# Patient Record
Sex: FEMALE | Race: WHITE | NOT HISPANIC OR LATINO | URBAN - METROPOLITAN AREA
[De-identification: names, ages, dates, MRNs, and addresses within clinical notes are randomized per-mention and may not be internally consistent; named-entity substitution may affect disease eponyms.]

---

## 2021-01-26 ENCOUNTER — OFFICE VISIT (OUTPATIENT)
Dept: URBAN - METROPOLITAN AREA CLINIC 68 | Facility: CLINIC | Age: 71
End: 2021-01-26

## 2021-02-23 ENCOUNTER — OFFICE VISIT (OUTPATIENT)
Dept: URBAN - METROPOLITAN AREA CLINIC 68 | Facility: CLINIC | Age: 71
End: 2021-02-23

## 2021-08-10 ENCOUNTER — OFFICE VISIT (OUTPATIENT)
Dept: URBAN - METROPOLITAN AREA CLINIC 68 | Facility: CLINIC | Age: 71
End: 2021-08-10

## 2021-08-12 ENCOUNTER — TELEPHONE ENCOUNTER (OUTPATIENT)
Dept: URBAN - METROPOLITAN AREA CLINIC 68 | Facility: CLINIC | Age: 71
End: 2021-08-12

## 2021-08-17 ENCOUNTER — TELEPHONE ENCOUNTER (OUTPATIENT)
Dept: URBAN - METROPOLITAN AREA CLINIC 68 | Facility: CLINIC | Age: 71
End: 2021-08-17

## 2021-09-01 ENCOUNTER — OFFICE VISIT (OUTPATIENT)
Dept: URBAN - METROPOLITAN AREA CLINIC 68 | Facility: CLINIC | Age: 71
End: 2021-09-01

## 2021-11-04 ENCOUNTER — OFFICE VISIT (OUTPATIENT)
Dept: URBAN - METROPOLITAN AREA CLINIC 68 | Facility: CLINIC | Age: 71
End: 2021-11-04

## 2022-02-16 ENCOUNTER — TELEPHONE ENCOUNTER (OUTPATIENT)
Dept: URBAN - METROPOLITAN AREA CLINIC 68 | Facility: CLINIC | Age: 72
End: 2022-02-16

## 2022-02-17 ENCOUNTER — TELEPHONE ENCOUNTER (OUTPATIENT)
Dept: URBAN - METROPOLITAN AREA CLINIC 68 | Facility: CLINIC | Age: 72
End: 2022-02-17

## 2022-02-22 ENCOUNTER — OFFICE VISIT (OUTPATIENT)
Dept: URBAN - METROPOLITAN AREA CLINIC 68 | Facility: CLINIC | Age: 72
End: 2022-02-22

## 2022-02-22 ENCOUNTER — TELEPHONE ENCOUNTER (OUTPATIENT)
Dept: URBAN - METROPOLITAN AREA CLINIC 68 | Facility: CLINIC | Age: 72
End: 2022-02-22

## 2022-03-10 ENCOUNTER — TELEPHONE ENCOUNTER (OUTPATIENT)
Dept: URBAN - METROPOLITAN AREA CLINIC 68 | Facility: CLINIC | Age: 72
End: 2022-03-10

## 2022-03-11 ENCOUNTER — TELEPHONE ENCOUNTER (OUTPATIENT)
Dept: URBAN - METROPOLITAN AREA CLINIC 68 | Facility: CLINIC | Age: 72
End: 2022-03-11

## 2022-03-11 ENCOUNTER — OFFICE VISIT (OUTPATIENT)
Dept: URBAN - METROPOLITAN AREA CLINIC 68 | Facility: CLINIC | Age: 72
End: 2022-03-11

## 2022-03-12 ENCOUNTER — TELEPHONE ENCOUNTER (OUTPATIENT)
Dept: URBAN - METROPOLITAN AREA CLINIC 68 | Facility: CLINIC | Age: 72
End: 2022-03-12

## 2022-03-22 ENCOUNTER — TELEPHONE ENCOUNTER (OUTPATIENT)
Dept: URBAN - METROPOLITAN AREA CLINIC 68 | Facility: CLINIC | Age: 72
End: 2022-03-22

## 2022-04-04 ENCOUNTER — OFFICE VISIT (OUTPATIENT)
Dept: URBAN - METROPOLITAN AREA CLINIC 68 | Facility: CLINIC | Age: 72
End: 2022-04-04

## 2022-06-04 ENCOUNTER — TELEPHONE ENCOUNTER (OUTPATIENT)
Dept: URBAN - METROPOLITAN AREA CLINIC 68 | Facility: CLINIC | Age: 72
End: 2022-06-04

## 2022-06-04 RX ORDER — SODIUM SULFATE, POTASSIUM SULFATE, MAGNESIUM SULFATE 17.5; 3.13; 1.6 G/ML; G/ML; G/ML
SOLUTION, CONCENTRATE ORAL AS DIRECTED
Qty: 1 | Refills: 0 | OUTPATIENT
Start: 2018-10-17 | End: 2018-10-18

## 2022-06-04 RX ORDER — METRONIDAZOLE 500 MG/1
TABLET ORAL
Qty: 42 | Refills: 0 | OUTPATIENT
Start: 2021-08-12 | End: 2021-08-26

## 2022-06-04 RX ORDER — CIPROFLOXACIN HYDROCHLORIDE 500 MG/1
TABLET, FILM COATED ORAL
Qty: 28 | Refills: 0 | OUTPATIENT
Start: 2021-08-12 | End: 2021-08-26

## 2022-06-05 ENCOUNTER — TELEPHONE ENCOUNTER (OUTPATIENT)
Dept: URBAN - METROPOLITAN AREA CLINIC 68 | Facility: CLINIC | Age: 72
End: 2022-06-05

## 2022-06-05 RX ORDER — ATORVASTATIN CALCIUM 40 MG/1
ATORVASTATIN CALCIUM( 40MG ORAL  DAILY ) ACTIVE -HX ENTRY TABLET, FILM COATED ORAL DAILY
Status: ACTIVE | COMMUNITY
Start: 2022-04-04

## 2022-06-05 RX ORDER — ALENDRONATE SODIUM 70 MG/1
ALENDRONATE SODIUM( 70MG ORAL  DAILY ) ACTIVE -HX ENTRY TABLET ORAL DAILY
Status: ACTIVE | COMMUNITY
Start: 2022-04-04

## 2022-06-25 ENCOUNTER — TELEPHONE ENCOUNTER (OUTPATIENT)
Age: 72
End: 2022-06-25

## 2022-06-25 RX ORDER — SODIUM SULFATE, POTASSIUM SULFATE, MAGNESIUM SULFATE 17.5; 3.13; 1.6 G/ML; G/ML; G/ML
SOLUTION, CONCENTRATE ORAL AS DIRECTED
Qty: 1 | Refills: 0 | OUTPATIENT
Start: 2018-10-17 | End: 2018-10-18

## 2022-06-25 RX ORDER — HYDROCORTISONE ACETATE AND PRAMOXINE HYDROCHLORIDE 25; 10 MG/G; MG/G
CREAM TOPICAL
Qty: 28 | Refills: 28 | OUTPATIENT
Start: 2018-10-30 | End: 2018-11-16

## 2022-06-25 RX ORDER — METRONIDAZOLE 500 MG/1
TABLET ORAL
Qty: 42 | Refills: 0 | OUTPATIENT
Start: 2021-08-12 | End: 2021-08-26

## 2022-06-25 RX ORDER — CIPROFLOXACIN HYDROCHLORIDE 500 MG/1
TABLET, FILM COATED ORAL
Qty: 28 | Refills: 0 | OUTPATIENT
Start: 2021-08-12 | End: 2021-08-26

## 2022-06-26 ENCOUNTER — TELEPHONE ENCOUNTER (OUTPATIENT)
Age: 72
End: 2022-06-26

## 2022-06-26 RX ORDER — VITAMIN E (DL,TOCOPHERYL ACET) 180 MG
COQ10(    DAILY ) ACTIVE -HX ENTRY CAPSULE ORAL DAILY
Status: ACTIVE | COMMUNITY
Start: 2022-04-04

## 2022-06-26 RX ORDER — BIFIDOBACTERIUM LONGUM SUBSP. INFANTIS, AVOBENZONE, HOMOSALATE, OCTISALATE, OCTOCRYLENE, AND OXYBENZONE
ALIGN(    DAILY ) ACTIVE -HX ENTRY KIT DAILY
Status: ACTIVE | COMMUNITY
Start: 2022-04-04

## 2022-06-26 RX ORDER — CALCIUM CARBONATE/VITAMIN D3 600 MG-10
CALCIUM(    DAILY ) ACTIVE -HX ENTRY TABLET ORAL DAILY
Status: ACTIVE | COMMUNITY
Start: 2022-04-04

## 2022-06-26 RX ORDER — ALENDRONATE SODIUM 70 MG/1
ALENDRONATE SODIUM( 70MG ORAL  DAILY ) ACTIVE -HX ENTRY TABLET ORAL DAILY
Status: ACTIVE | COMMUNITY
Start: 2022-04-04

## 2022-06-26 RX ORDER — ASPIRIN 81 MG/1
BAYER ASPIRIN(   81MG DAILY ) ACTIVE -HX ENTRY TABLET ORAL DAILY
Status: ACTIVE | COMMUNITY
Start: 2022-04-04

## 2022-06-26 RX ORDER — ATORVASTATIN CALCIUM 40 MG/1
ATORVASTATIN CALCIUM( 40MG ORAL  DAILY ) ACTIVE -HX ENTRY TABLET, FILM COATED ORAL DAILY
Status: ACTIVE | COMMUNITY
Start: 2022-04-04

## 2023-09-11 ENCOUNTER — LAB OUTSIDE AN ENCOUNTER (OUTPATIENT)
Dept: URBAN - METROPOLITAN AREA CLINIC 68 | Facility: CLINIC | Age: 73
End: 2023-09-11

## 2023-09-11 ENCOUNTER — WEB ENCOUNTER (OUTPATIENT)
Dept: URBAN - METROPOLITAN AREA CLINIC 68 | Facility: CLINIC | Age: 73
End: 2023-09-11

## 2023-09-11 ENCOUNTER — OFFICE VISIT (OUTPATIENT)
Dept: URBAN - METROPOLITAN AREA CLINIC 68 | Facility: CLINIC | Age: 73
End: 2023-09-11
Payer: MEDICARE

## 2023-09-11 VITALS
HEIGHT: 64 IN | WEIGHT: 172 LBS | DIASTOLIC BLOOD PRESSURE: 72 MMHG | BODY MASS INDEX: 29.37 KG/M2 | OXYGEN SATURATION: 92 % | HEART RATE: 76 BPM | SYSTOLIC BLOOD PRESSURE: 122 MMHG

## 2023-09-11 DIAGNOSIS — K57.90 DIVERTICULOSIS: ICD-10-CM

## 2023-09-11 DIAGNOSIS — K59.04 CHRONIC IDIOPATHIC CONSTIPATION: ICD-10-CM

## 2023-09-11 DIAGNOSIS — Z86.010 HISTORY OF COLON POLYPS: ICD-10-CM

## 2023-09-11 PROBLEM — 397881000: Status: ACTIVE | Noted: 2023-09-11

## 2023-09-11 PROBLEM — 82934008: Status: ACTIVE | Noted: 2023-09-11

## 2023-09-11 PROCEDURE — 99214 OFFICE O/P EST MOD 30 MIN: CPT | Performed by: INTERNAL MEDICINE

## 2023-09-11 RX ORDER — BIFIDOBACTERIUM LONGUM SUBSP. INFANTIS, AVOBENZONE, HOMOSALATE, OCTISALATE, OCTOCRYLENE, AND OXYBENZONE
ALIGN(    DAILY ) ACTIVE -HX ENTRY KIT DAILY
Status: DISCONTINUED | COMMUNITY
Start: 2022-04-04

## 2023-09-11 RX ORDER — CALCIUM CARBONATE/VITAMIN D3 600 MG-10
CALCIUM(    DAILY ) ACTIVE -HX ENTRY TABLET ORAL DAILY
Status: ACTIVE | COMMUNITY
Start: 2022-04-04

## 2023-09-11 RX ORDER — LORATADINE 10 MG
1 PACKET MIXED WITH 8 OUNCES OF FLUID TABLET,DISINTEGRATING ORAL ONCE A DAY
Status: ACTIVE | COMMUNITY

## 2023-09-11 RX ORDER — ASPIRIN 81 MG/1
BAYER ASPIRIN(   81MG DAILY ) ACTIVE -HX ENTRY TABLET ORAL DAILY
Status: ACTIVE | COMMUNITY
Start: 2022-04-04

## 2023-09-11 RX ORDER — PSYLLIUM SEED
AS DIRECTED PACKET (EA) ORAL
Status: ACTIVE | COMMUNITY

## 2023-09-11 RX ORDER — ASPIRIN 81 MG/1
BAYER ASPIRIN(   81MG DAILY ) ACTIVE -HX ENTRY TABLET ORAL DAILY
Status: DISCONTINUED | COMMUNITY
Start: 2022-04-04

## 2023-09-11 RX ORDER — VITAMIN E (DL,TOCOPHERYL ACET) 180 MG
COQ10(    DAILY ) ACTIVE -HX ENTRY CAPSULE ORAL DAILY
Status: ACTIVE | COMMUNITY
Start: 2022-04-04

## 2023-09-11 RX ORDER — ALENDRONATE SODIUM 70 MG/1
ALENDRONATE SODIUM( 70MG ORAL  DAILY ) ACTIVE -HX ENTRY TABLET ORAL DAILY
Status: ACTIVE | COMMUNITY
Start: 2022-04-04

## 2023-09-11 RX ORDER — SOD SULF/POT CHLORIDE/MAG SULF 1.479 G
12 TABLETS THE FIRST DOSE THE EVENING BEFORE AND SECOND DOSE THE MORNING OF COLONOSCOPY TABLET ORAL TWICE A DAY
Qty: 24 | OUTPATIENT
Start: 2023-09-11 | End: 2023-09-12

## 2023-09-11 RX ORDER — ATORVASTATIN CALCIUM 40 MG/1
ATORVASTATIN CALCIUM( 40MG ORAL  DAILY ) ACTIVE -HX ENTRY TABLET, FILM COATED ORAL DAILY
Status: ACTIVE | COMMUNITY
Start: 2022-04-04

## 2023-09-11 RX ORDER — CALCIUM CARBONATE/VITAMIN D3 600 MG-10
CALCIUM(    DAILY ) ACTIVE -HX ENTRY TABLET ORAL DAILY
Status: DISCONTINUED | COMMUNITY
Start: 2022-04-04

## 2023-09-11 RX ORDER — VITAMIN E (DL,TOCOPHERYL ACET) 180 MG
COQ10(    DAILY ) ACTIVE -HX ENTRY CAPSULE ORAL DAILY
Status: DISCONTINUED | COMMUNITY
Start: 2022-04-04

## 2023-09-11 RX ORDER — SODIUM PICOSULFATE, MAGNESIUM OXIDE, AND ANHYDROUS CITRIC ACID 12; 3.5; 1 G/175ML; G/175ML; MG/175ML
175 ML THE FIRST DOSE THE EVENING BEFORE AND SECOND DOSE THE MORNING OF COLONOSCOPY LIQUID ORAL ONCE A DAY
Qty: 350 | OUTPATIENT
Start: 2023-09-11 | End: 2023-09-13

## 2023-09-11 NOTE — HPI-TODAY'S VISIT:
73 y.o. WF with a history of colon polyps and chronic constipation who is here for follow up. She did have an incomplete colonoscopy in 2018 and had a CT virtual colonoscopy which showed diverticulosis and IH. She had 2 gastrograffin enema which showed diverticulosis and IH. She has tried Metamucil, Miralax, Calcium with Magnesium, and Flax seed oil. She is agreeable to prep for planned colonoscopy and if unsuccessful then will refer for virtual colonoscopy.

## 2023-10-31 ENCOUNTER — OFFICE VISIT (OUTPATIENT)
Dept: URBAN - METROPOLITAN AREA CLINIC 68 | Facility: CLINIC | Age: 73
End: 2023-10-31

## 2023-11-27 ENCOUNTER — CLAIMS CREATED FROM THE CLAIM WINDOW (OUTPATIENT)
Dept: URBAN - METROPOLITAN AREA SURGERY CENTER 12 | Facility: SURGERY CENTER | Age: 73
End: 2023-11-27
Payer: MEDICARE

## 2023-11-27 ENCOUNTER — CLAIMS CREATED FROM THE CLAIM WINDOW (OUTPATIENT)
Dept: URBAN - METROPOLITAN AREA CLINIC 4 | Facility: CLINIC | Age: 73
End: 2023-11-27
Payer: MEDICARE

## 2023-11-27 DIAGNOSIS — K63.5 POLYP OF SIGMOID COLON, UNSPECIFIED TYPE: ICD-10-CM

## 2023-11-27 DIAGNOSIS — K57.30 DIVERTICULOSIS OF LARGE INTESTINE WITHOUT PERFORATION OR ABSCESS WITHOUT BLEEDING: ICD-10-CM

## 2023-11-27 DIAGNOSIS — K63.89 OTHER SPECIFIED DISEASES OF INTESTINE: ICD-10-CM

## 2023-11-27 DIAGNOSIS — Z87.19 PERSONAL HISTORY OF OTHER DISEASES OF THE DIGESTIVE SYSTEM: ICD-10-CM

## 2023-11-27 DIAGNOSIS — K64.1 SECOND DEGREE HEMORRHOIDS: ICD-10-CM

## 2023-11-27 DIAGNOSIS — D12.5 ADENOMATOUS POLYP OF SIGMOID COLON: ICD-10-CM

## 2023-11-27 DIAGNOSIS — K64.8 OTHER HEMORRHOIDS: ICD-10-CM

## 2023-11-27 DIAGNOSIS — Z12.11 ENCOUNTER FOR SCREENING FOR MALIGNANT NEOPLASM OF COLON: ICD-10-CM

## 2023-11-27 DIAGNOSIS — Q43.8 OTHER SPECIFIED CONGENITAL MALFORMATIONS OF INTESTINE: ICD-10-CM

## 2023-11-27 DIAGNOSIS — K57.30 COLON, DIVERTICULOSIS: ICD-10-CM

## 2023-11-27 DIAGNOSIS — Z86.010 ADENOMAS PERSONAL HISTORY OF COLONIC POLYPS: ICD-10-CM

## 2023-11-27 PROCEDURE — 45385 COLONOSCOPY W/LESION REMOVAL: CPT | Performed by: CLINIC/CENTER

## 2023-11-27 PROCEDURE — 88305 TISSUE EXAM BY PATHOLOGIST: CPT | Performed by: PATHOLOGY

## 2023-11-27 PROCEDURE — 45385 COLONOSCOPY W/LESION REMOVAL: CPT | Performed by: INTERNAL MEDICINE

## 2023-11-27 PROCEDURE — 00811 ANES LWR INTST NDSC NOS: CPT | Performed by: NURSE ANESTHETIST, CERTIFIED REGISTERED

## 2023-11-27 RX ORDER — PSYLLIUM SEED
AS DIRECTED PACKET (EA) ORAL
Status: ACTIVE | COMMUNITY

## 2023-11-27 RX ORDER — LORATADINE 10 MG
1 PACKET MIXED WITH 8 OUNCES OF FLUID TABLET,DISINTEGRATING ORAL ONCE A DAY
Status: ACTIVE | COMMUNITY

## 2023-11-27 RX ORDER — ALENDRONATE SODIUM 70 MG/1
ALENDRONATE SODIUM( 70MG ORAL  DAILY ) ACTIVE -HX ENTRY TABLET ORAL DAILY
Status: ACTIVE | COMMUNITY
Start: 2022-04-04

## 2023-11-27 RX ORDER — ATORVASTATIN CALCIUM 40 MG/1
ATORVASTATIN CALCIUM( 40MG ORAL  DAILY ) ACTIVE -HX ENTRY TABLET, FILM COATED ORAL DAILY
Status: ACTIVE | COMMUNITY
Start: 2022-04-04

## 2023-11-27 RX ORDER — ASPIRIN 81 MG/1
BAYER ASPIRIN(   81MG DAILY ) ACTIVE -HX ENTRY TABLET ORAL DAILY
Status: ACTIVE | COMMUNITY
Start: 2022-04-04

## 2023-11-27 RX ORDER — CALCIUM CARBONATE/VITAMIN D3 600 MG-10
CALCIUM(    DAILY ) ACTIVE -HX ENTRY TABLET ORAL DAILY
Status: ACTIVE | COMMUNITY
Start: 2022-04-04

## 2023-11-27 RX ORDER — VITAMIN E (DL,TOCOPHERYL ACET) 180 MG
COQ10(    DAILY ) ACTIVE -HX ENTRY CAPSULE ORAL DAILY
Status: ACTIVE | COMMUNITY
Start: 2022-04-04

## 2024-05-07 ENCOUNTER — DASHBOARD ENCOUNTERS (OUTPATIENT)
Age: 74
End: 2024-05-07

## 2024-05-07 ENCOUNTER — OFFICE VISIT (OUTPATIENT)
Dept: URBAN - METROPOLITAN AREA CLINIC 68 | Facility: CLINIC | Age: 74
End: 2024-05-07
Payer: MEDICARE

## 2024-05-07 VITALS
TEMPERATURE: 97.8 F | HEIGHT: 64 IN | DIASTOLIC BLOOD PRESSURE: 80 MMHG | BODY MASS INDEX: 28.85 KG/M2 | HEART RATE: 76 BPM | WEIGHT: 169 LBS | OXYGEN SATURATION: 98 % | SYSTOLIC BLOOD PRESSURE: 124 MMHG

## 2024-05-07 DIAGNOSIS — R15.0 INCOMPLETE DEFECATION: ICD-10-CM

## 2024-05-07 DIAGNOSIS — K64.9 HEMORRHOIDS, UNSPECIFIED HEMORRHOID TYPE: ICD-10-CM

## 2024-05-07 DIAGNOSIS — K59.04 CHRONIC IDIOPATHIC CONSTIPATION: ICD-10-CM

## 2024-05-07 DIAGNOSIS — L29.0 RECTAL ITCHING: ICD-10-CM

## 2024-05-07 PROBLEM — 90446007: Status: ACTIVE | Noted: 2024-05-07

## 2024-05-07 PROCEDURE — 99214 OFFICE O/P EST MOD 30 MIN: CPT

## 2024-05-07 RX ORDER — HYDROCORTISONE ACETATE AND PRAMOXINE HYDROCHLORIDE 25; 10 MG/G; MG/G
1 APPLICATION CREAM TOPICAL
Qty: 1 | Refills: 0 | OUTPATIENT
Start: 2024-05-07 | End: 2024-05-17

## 2024-05-07 RX ORDER — LORATADINE 10 MG
1 PACKET MIXED WITH 8 OUNCES OF FLUID TABLET,DISINTEGRATING ORAL ONCE A DAY
Status: ACTIVE | COMMUNITY

## 2024-05-07 RX ORDER — ALENDRONATE SODIUM 70 MG/1
ALENDRONATE SODIUM( 70MG ORAL  DAILY ) ACTIVE -HX ENTRY TABLET ORAL DAILY
Status: ACTIVE | COMMUNITY
Start: 2022-04-04

## 2024-05-07 RX ORDER — VITAMIN E (DL,TOCOPHERYL ACET) 180 MG
COQ10(    DAILY ) ACTIVE -HX ENTRY CAPSULE ORAL DAILY
Status: ACTIVE | COMMUNITY
Start: 2022-04-04

## 2024-05-07 RX ORDER — PSYLLIUM SEED
AS DIRECTED PACKET (EA) ORAL
Status: ACTIVE | COMMUNITY

## 2024-05-07 RX ORDER — ASPIRIN 81 MG/1
BAYER ASPIRIN(   81MG DAILY ) ACTIVE -HX ENTRY TABLET ORAL DAILY
Status: ACTIVE | COMMUNITY
Start: 2022-04-04

## 2024-05-07 RX ORDER — ATORVASTATIN CALCIUM 40 MG/1
ATORVASTATIN CALCIUM( 40MG ORAL  DAILY ) ACTIVE -HX ENTRY TABLET, FILM COATED ORAL DAILY
Status: ACTIVE | COMMUNITY
Start: 2022-04-04

## 2024-06-27 ENCOUNTER — OFFICE VISIT (OUTPATIENT)
Dept: URBAN - METROPOLITAN AREA CLINIC 68 | Facility: CLINIC | Age: 74
End: 2024-06-27
Payer: MEDICARE

## 2024-06-27 VITALS
TEMPERATURE: 97.7 F | BODY MASS INDEX: 29.02 KG/M2 | HEIGHT: 64 IN | SYSTOLIC BLOOD PRESSURE: 126 MMHG | OXYGEN SATURATION: 98 % | HEART RATE: 71 BPM | WEIGHT: 170 LBS | DIASTOLIC BLOOD PRESSURE: 80 MMHG

## 2024-06-27 DIAGNOSIS — Z86.010 HISTORY OF COLON POLYPS: ICD-10-CM

## 2024-06-27 DIAGNOSIS — L29.0 RECTAL ITCHING: ICD-10-CM

## 2024-06-27 DIAGNOSIS — K59.04 CHRONIC IDIOPATHIC CONSTIPATION: ICD-10-CM

## 2024-06-27 PROCEDURE — 99214 OFFICE O/P EST MOD 30 MIN: CPT

## 2024-06-27 RX ORDER — ALENDRONATE SODIUM 70 MG/1
ALENDRONATE SODIUM( 70MG ORAL  DAILY ) ACTIVE -HX ENTRY TABLET ORAL DAILY
Status: ACTIVE | COMMUNITY
Start: 2022-04-04

## 2024-06-27 RX ORDER — PSYLLIUM SEED
AS DIRECTED PACKET (EA) ORAL
Status: ACTIVE | COMMUNITY

## 2024-06-27 RX ORDER — ATORVASTATIN CALCIUM 40 MG/1
ATORVASTATIN CALCIUM( 40MG ORAL  DAILY ) ACTIVE -HX ENTRY TABLET, FILM COATED ORAL DAILY
Status: ACTIVE | COMMUNITY
Start: 2022-04-04

## 2024-06-27 RX ORDER — LORATADINE 10 MG
1 PACKET MIXED WITH 8 OUNCES OF FLUID TABLET,DISINTEGRATING ORAL ONCE A DAY
Status: ACTIVE | COMMUNITY

## 2024-06-27 RX ORDER — HYDROCORTISONE ACETATE AND PRAMOXINE HYDROCHLORIDE 25; 10 MG/G; MG/G
1 APPLICATION CREAM TOPICAL
Qty: 1 | Refills: 1
Start: 2024-05-07 | End: 2024-07-17

## 2024-06-27 RX ORDER — ASPIRIN 81 MG/1
BAYER ASPIRIN(   81MG DAILY ) ACTIVE -HX ENTRY TABLET ORAL DAILY
Status: ACTIVE | COMMUNITY
Start: 2022-04-04

## 2024-06-27 RX ORDER — VITAMIN E (DL,TOCOPHERYL ACET) 180 MG
COQ10(    DAILY ) ACTIVE -HX ENTRY CAPSULE ORAL DAILY
Status: ACTIVE | COMMUNITY
Start: 2022-04-04

## 2024-06-27 NOTE — HPI-TODAY'S VISIT:
73 y/o F with history of colon polyps (colonoscopy 11/2023) and chronic constipation, presenting for follow-up due to persistent rectal itching. On her last evaluation she was starte don Anusol topical cream. She presents today for follow-up and describes significant relief. She continues on Miralax once daily and is now taking Metamucil tid with moderate relief of constipation. She is recommended to increase Miralax to bid. Patient denies nausea, vomiting, dysphagia, odynophagia, heartburn, abdominal pain, diarrhea, GI bleeding, or unintentional weight loss.
Initiate Treatment: Fluocinonide scalp solution 2-3 times a week
Detail Level: Zone
Otc Regimen: Biotin supplement \\nStrengthening shampoo viviscal or other

## 2024-11-19 ENCOUNTER — ERX REFILL RESPONSE (OUTPATIENT)
Dept: URBAN - METROPOLITAN AREA CLINIC 68 | Facility: CLINIC | Age: 74
End: 2024-11-19

## 2024-11-19 RX ORDER — HYDROCORTISONE ACETATE, PRAMOXINE HCL 2.5; 1 G/100G; G/100G
APPLY TO THE AFFECTED AREA(S) RECTALLY TWICE DAILY FOR 10 DAYS CREAM TOPICAL
Qty: 30 | Refills: 2 | OUTPATIENT

## 2024-11-19 RX ORDER — HYDROCORTISONE ACETATE, PRAMOXINE HCL 2.5; 1 G/100G; G/100G
APPLY TO THE AFFECTED AREA(S) RECTALLY TWICE DAILY FOR 10 DAYS CREAM TOPICAL
Qty: 30 | Refills: 1 | OUTPATIENT